# Patient Record
Sex: MALE | HISPANIC OR LATINO | URBAN - METROPOLITAN AREA
[De-identification: names, ages, dates, MRNs, and addresses within clinical notes are randomized per-mention and may not be internally consistent; named-entity substitution may affect disease eponyms.]

---

## 2018-07-11 ENCOUNTER — OFFICE VISIT (OUTPATIENT)
Dept: URBAN - METROPOLITAN AREA CLINIC 48 | Facility: CLINIC | Age: 48
End: 2018-07-11

## 2018-07-11 PROCEDURE — 99212 OFFICE O/P EST SF 10 MIN: CPT | Performed by: OPHTHALMOLOGY

## 2018-07-11 ASSESSMENT — INTRAOCULAR PRESSURE
OD: 19
OS: 20

## 2018-07-11 NOTE — IMPRESSION/PLAN
Impression: Retained (old) foreign body in vitreous body, left eye: H42.871. Plan: The retina continues to be attached and stable after surgical repair with silicone oil. In order to maximize the visual potential and avoid the potential complications of intraocular silicone oil. Surgical removal is advised. After discussion with Patient he would like to observe for another 6 months. Patient understands and call if vision worsens.

## 2019-01-09 ENCOUNTER — OFFICE VISIT (OUTPATIENT)
Dept: URBAN - METROPOLITAN AREA CLINIC 48 | Facility: CLINIC | Age: 49
End: 2019-01-09

## 2019-01-09 DIAGNOSIS — H44.752: Primary | ICD-10-CM

## 2019-01-09 PROCEDURE — 99213 OFFICE O/P EST LOW 20 MIN: CPT | Performed by: OPHTHALMOLOGY

## 2019-01-09 PROCEDURE — 92134 CPTRZ OPH DX IMG PST SGM RTA: CPT | Performed by: OPHTHALMOLOGY

## 2019-01-09 ASSESSMENT — INTRAOCULAR PRESSURE
OS: 17
OD: 16

## 2019-01-09 NOTE — IMPRESSION/PLAN
Impression: Retained (old) foreign body in vitreous body, left eye: H40.891. Plan: The retina continues to be attached and stable after surgical repair with silicone oil. Surgical removal is not advised. After discussion with Patient he would like to observe for another 6 months. Patient understands and call if vision worsens.

## 2019-07-10 ENCOUNTER — OFFICE VISIT (OUTPATIENT)
Dept: URBAN - METROPOLITAN AREA CLINIC 48 | Facility: CLINIC | Age: 49
End: 2019-07-10

## 2019-07-10 PROCEDURE — 99213 OFFICE O/P EST LOW 20 MIN: CPT | Performed by: OPHTHALMOLOGY

## 2019-07-10 PROCEDURE — 92134 CPTRZ OPH DX IMG PST SGM RTA: CPT | Performed by: OPHTHALMOLOGY

## 2019-07-10 ASSESSMENT — INTRAOCULAR PRESSURE
OD: 17
OS: 16

## 2019-07-10 NOTE — IMPRESSION/PLAN
Impression: Retained (old) foreign body in vitreous body, left eye: H44.752. OS. Plan: The retina continues to be attached and stable after surgical repair with silicone oil. Surgical removal is not advised at this time. After discussion with Patient he would like to continue to observe. Patient understands and call if vision worsens.

## 2020-07-22 ENCOUNTER — OFFICE VISIT (OUTPATIENT)
Dept: URBAN - METROPOLITAN AREA CLINIC 48 | Facility: CLINIC | Age: 50
End: 2020-07-22

## 2020-07-22 PROCEDURE — 92134 CPTRZ OPH DX IMG PST SGM RTA: CPT | Performed by: OPHTHALMOLOGY

## 2020-07-22 PROCEDURE — 99213 OFFICE O/P EST LOW 20 MIN: CPT | Performed by: OPHTHALMOLOGY

## 2020-07-22 ASSESSMENT — INTRAOCULAR PRESSURE
OS: 17
OD: 15

## 2020-07-22 NOTE — IMPRESSION/PLAN
Impression: Retained (old) foreign body in vitreous body, left eye: H44.752. OS. Plan: OCT ordered and performed today, The retina continues to be attached and stable after surgical repair with silicone oil. Surgical removal is not advised at this time. After discussion with Patient he would like to continue to observe. Patient understands and call if vision worsens.

## 2021-01-27 ENCOUNTER — OFFICE VISIT (OUTPATIENT)
Dept: URBAN - METROPOLITAN AREA CLINIC 48 | Facility: CLINIC | Age: 51
End: 2021-01-27

## 2021-01-27 PROCEDURE — 92134 CPTRZ OPH DX IMG PST SGM RTA: CPT | Performed by: OPHTHALMOLOGY

## 2021-01-27 PROCEDURE — 99213 OFFICE O/P EST LOW 20 MIN: CPT | Performed by: OPHTHALMOLOGY

## 2021-01-27 ASSESSMENT — INTRAOCULAR PRESSURE
OS: 15
OD: 21

## 2022-04-06 ENCOUNTER — OFFICE VISIT (OUTPATIENT)
Dept: URBAN - METROPOLITAN AREA CLINIC 48 | Facility: CLINIC | Age: 52
End: 2022-04-06

## 2022-04-06 PROCEDURE — 99213 OFFICE O/P EST LOW 20 MIN: CPT | Performed by: OPHTHALMOLOGY

## 2022-04-06 PROCEDURE — 92134 CPTRZ OPH DX IMG PST SGM RTA: CPT | Performed by: OPHTHALMOLOGY

## 2022-04-06 ASSESSMENT — INTRAOCULAR PRESSURE
OS: 12
OD: 13

## 2022-04-06 NOTE — IMPRESSION/PLAN
Impression: Retained (old) foreign body in vitreous body, left eye: H44.752. OS.

OCT:
OD: wnl OS: atrophic Plan: Stable exam. Attached under SO. Previous decision was made to leave SO in eye. I agree pt is at high risk for redetachment if SO removed due to tractional membranes. Given limited visual potential I would prefer to observe.  

RTC 1 year DFE OU OCT OU

## 2023-04-05 ENCOUNTER — OFFICE VISIT (OUTPATIENT)
Facility: LOCATION | Age: 53
End: 2023-04-05

## 2023-04-05 DIAGNOSIS — H44.752 RETAINED (NONMAGNETIC) (OLD) FOREIGN BODY IN VITREOUS BODY, LEFT EYE: Primary | ICD-10-CM

## 2023-04-05 PROCEDURE — 92134 CPTRZ OPH DX IMG PST SGM RTA: CPT | Performed by: OPHTHALMOLOGY

## 2023-04-05 PROCEDURE — 99213 OFFICE O/P EST LOW 20 MIN: CPT | Performed by: OPHTHALMOLOGY

## 2023-04-05 ASSESSMENT — INTRAOCULAR PRESSURE
OS: 16
OD: 13